# Patient Record
Sex: FEMALE | Race: WHITE
[De-identification: names, ages, dates, MRNs, and addresses within clinical notes are randomized per-mention and may not be internally consistent; named-entity substitution may affect disease eponyms.]

---

## 2016-09-19 VITALS
DIASTOLIC BLOOD PRESSURE: 93 MMHG | DIASTOLIC BLOOD PRESSURE: 93 MMHG | SYSTOLIC BLOOD PRESSURE: 130 MMHG | DIASTOLIC BLOOD PRESSURE: 93 MMHG | DIASTOLIC BLOOD PRESSURE: 93 MMHG | SYSTOLIC BLOOD PRESSURE: 130 MMHG | SYSTOLIC BLOOD PRESSURE: 130 MMHG | SYSTOLIC BLOOD PRESSURE: 130 MMHG

## 2017-11-22 ENCOUNTER — HOSPITAL ENCOUNTER (OUTPATIENT)
Dept: HOSPITAL 24 - RAD | Age: 42
End: 2017-11-22
Attending: INTERNAL MEDICINE
Payer: COMMERCIAL

## 2017-11-22 DIAGNOSIS — R00.2: ICD-10-CM

## 2017-11-22 DIAGNOSIS — R55: Primary | ICD-10-CM

## 2017-11-22 PROCEDURE — 93306 TTE W/DOPPLER COMPLETE: CPT

## 2018-01-12 ENCOUNTER — HOSPITAL ENCOUNTER (OUTPATIENT)
Dept: HOSPITAL 24 - RAD | Age: 43
End: 2018-01-12
Attending: INTERNAL MEDICINE
Payer: COMMERCIAL

## 2018-01-12 DIAGNOSIS — R51: Primary | ICD-10-CM

## 2018-01-12 DIAGNOSIS — R55: ICD-10-CM

## 2018-01-12 DIAGNOSIS — H53.9: ICD-10-CM

## 2018-01-12 PROCEDURE — 70551 MRI BRAIN STEM W/O DYE: CPT

## 2018-01-12 NOTE — MRI
MRI BRAIN WITHOUT CONTRAST



CLINICAL HISTORY: 42-year-old female with headache and syncopal episode with visual disturbance.



COMPARISON: CT head 09/19/2016.



TECHNIQUE:  Multiplanar, multisequence MR images of the brain were obtained without contrast.



FINDINGS: There is no evidence of diffusion restriction. The craniocervical junction is normal. Pitui
tary and optic nerve complex are normal. Scattered, nonspecific bifrontal subcortical/juxta cortical 
and periventricular/supraventricular foci of T2 FLAIR hyperintense signal abnormality. Normal signal 
characteristics and morphology are demonstrated within the cerebral cortex, corpus callosum, deep gra
y nuclei, brainstem and cerebellum. The major vascular flow voids, to include the dural venous sinuse
s, are intact. The ventricular system is normal in size and morphology. The basilar cisterns are norm
al. 



The orbits and globes are within normal limits. Polypoid mucosal thickening of the floor of the right
 maxillary sinus. The remaining paranasal sinuses, tympanic cavities and mastoids are clear.



IMPRESSION:



1. No acute ischemic or hemorrhagic insult.

2. Nonspecific scattered punctate foci of T2 FLAIR hyperintense signal abnormality within the bifront
al white matter. This can be seen with chronic migraines. Differential considerations include, but ar
e not limited to, microangiopathy from hypertension or diabetes as well as atypical presentation of d
emyelinating process.

3. Polypoid mucosal thickening floor of the right maxillary sinus.





Reported By:Electronically Signed by ELIEL WILKES MD at 1/12/2018 8:41:15 PM

## 2018-03-29 ENCOUNTER — HOSPITAL ENCOUNTER (OUTPATIENT)
Dept: HOSPITAL 24 - RAD | Age: 43
End: 2018-03-29
Attending: INTERNAL MEDICINE
Payer: COMMERCIAL

## 2018-03-29 DIAGNOSIS — R04.2: ICD-10-CM

## 2018-03-29 DIAGNOSIS — R05: ICD-10-CM

## 2018-03-29 DIAGNOSIS — D15.2: Primary | ICD-10-CM

## 2018-03-29 PROCEDURE — 71260 CT THORAX DX C+: CPT

## 2018-04-12 ENCOUNTER — HOSPITAL ENCOUNTER (OUTPATIENT)
Dept: HOSPITAL 24 - RAD | Age: 43
End: 2018-04-12
Attending: INTERNAL MEDICINE
Payer: COMMERCIAL

## 2018-04-12 DIAGNOSIS — M25.512: Primary | ICD-10-CM
